# Patient Record
Sex: FEMALE | Race: WHITE | Employment: UNEMPLOYED | URBAN - METROPOLITAN AREA
[De-identification: names, ages, dates, MRNs, and addresses within clinical notes are randomized per-mention and may not be internally consistent; named-entity substitution may affect disease eponyms.]

---

## 2018-05-15 ENCOUNTER — OFFICE VISIT (OUTPATIENT)
Dept: OBGYN CLINIC | Facility: CLINIC | Age: 48
End: 2018-05-15

## 2018-05-15 VITALS
WEIGHT: 158 LBS | HEIGHT: 65 IN | HEART RATE: 72 BPM | RESPIRATION RATE: 18 BRPM | SYSTOLIC BLOOD PRESSURE: 122 MMHG | BODY MASS INDEX: 26.33 KG/M2 | DIASTOLIC BLOOD PRESSURE: 60 MMHG

## 2018-05-15 DIAGNOSIS — R87.610 ASCUS WITH POSITIVE HIGH RISK HPV CERVICAL: Primary | ICD-10-CM

## 2018-05-15 DIAGNOSIS — Z01.419 WELL WOMAN EXAM WITH ROUTINE GYNECOLOGICAL EXAM: ICD-10-CM

## 2018-05-15 DIAGNOSIS — R87.810 ASCUS WITH POSITIVE HIGH RISK HPV CERVICAL: Primary | ICD-10-CM

## 2018-05-15 PROCEDURE — 99386 PREV VISIT NEW AGE 40-64: CPT | Performed by: OBSTETRICS & GYNECOLOGY

## 2018-05-15 NOTE — PROGRESS NOTES
Dennie Forte is a 57year old female  Patient's last menstrual period was 2018 (approximate). Patient presents with:  Vaginal Problem: new pt. discuss abnormal pap results ascus hpv +   .      Need for colposcopy was discussed with her Motrin wa (ZOLOFT) 25 MG Oral Tab, Take 25 mg by mouth daily. , Disp: , Rfl:   •  lamoTRIgine (LAMICTAL) 25 MG Oral Tab, Take 25 mg by mouth daily. , Disp: , Rfl:   •  ClonazePAM (KLONOPIN) 0.5 MG Oral Tab, Take 0.25 mg by mouth 2 (two) times daily as needed for Anxie

## 2018-06-05 ENCOUNTER — OFFICE VISIT (OUTPATIENT)
Dept: OBGYN CLINIC | Facility: CLINIC | Age: 48
End: 2018-06-05

## 2018-06-05 VITALS
HEART RATE: 88 BPM | WEIGHT: 162 LBS | BODY MASS INDEX: 26.99 KG/M2 | SYSTOLIC BLOOD PRESSURE: 124 MMHG | HEIGHT: 65 IN | DIASTOLIC BLOOD PRESSURE: 56 MMHG

## 2018-06-05 DIAGNOSIS — N87.0 MILD DYSPLASIA OF CERVIX: ICD-10-CM

## 2018-06-05 DIAGNOSIS — R87.810 ASCUS WITH POSITIVE HIGH RISK HPV CERVICAL: Primary | ICD-10-CM

## 2018-06-05 DIAGNOSIS — R87.610 ASCUS WITH POSITIVE HIGH RISK HPV CERVICAL: Primary | ICD-10-CM

## 2018-06-05 PROCEDURE — 57454 BX/CURETT OF CERVIX W/SCOPE: CPT | Performed by: OBSTETRICS & GYNECOLOGY

## 2018-06-05 PROCEDURE — 88305 TISSUE EXAM BY PATHOLOGIST: CPT | Performed by: OBSTETRICS & GYNECOLOGY

## 2018-06-05 PROCEDURE — 88342 IMHCHEM/IMCYTCHM 1ST ANTB: CPT | Performed by: OBSTETRICS & GYNECOLOGY

## 2018-06-05 NOTE — PROGRESS NOTES
[facility]  Obstetrics and Gynecology  Colposcopy Procedure Note    Colpo w/Cx Biopsy and ECC        Consent signed. Procedure discussed with patient in detail including indication, risk, benefits, alternatives and complications.     P

## 2018-06-08 ENCOUNTER — TELEPHONE (OUTPATIENT)
Dept: OBGYN CLINIC | Facility: CLINIC | Age: 48
End: 2018-06-08

## 2018-06-08 NOTE — PROGRESS NOTES
Patient aware of results and recommendations for either cone biopsy or hysterectomy. Patient wants to proceed with cone biopsy, she will call me back after 3 pm to confirm.  Patient verbalizes understanding

## 2018-06-08 NOTE — TELEPHONE ENCOUNTER
----- Message from Benjamín Fay sent at 6/8/2018  2:58 PM CDT -----  Contact: patient   Patient is returning a call .

## 2018-06-08 NOTE — TELEPHONE ENCOUNTER
Spoke to patient, requests for cone biopsy to be schedule. Procedure schedule on 8/14/2018 at 0730 am. Patient aware and agrees.

## 2018-06-11 NOTE — TELEPHONE ENCOUNTER
After obtaining benefit info for upcoming surgery, it was determined that the patient's plan is not in network with our group/hospital. Patient does not have any out of network benefit coverage, Called patient and informed her of the situation.  I recommend

## 2018-06-23 ENCOUNTER — TELEPHONE (OUTPATIENT)
Dept: OBGYN CLINIC | Facility: CLINIC | Age: 48
End: 2018-06-23

## 2018-06-23 NOTE — TELEPHONE ENCOUNTER
Pt requested her medical records for continuation of care because we are not in network with her insurance. Test results give in person.  Thanks

## (undated) NOTE — LETTER
[facility] Department of OB/GYN  After Care Instructions for Colposcopy/Biopsy      Biopsy Results   You will receive a phone call with your biopsy results in 7 business days. If you have not received your biopsy results in 7 days, please contact our office.   Jean Murray

## (undated) NOTE — LETTER
Bob Chilel, :1970    CONSENT FOR PROCEDURE/SEDATION    1. I authorize the performance upon Bob Chilel  the following: Colposcopy    2.  I authorize Dr. Alyssa Paredes MD (and whomever is designated as the doctor’s assistant), to perform the Witness: _________________________________________ Date:___________     Physician Signature: _______________________________ Date:___________